# Patient Record
Sex: FEMALE | URBAN - METROPOLITAN AREA
[De-identification: names, ages, dates, MRNs, and addresses within clinical notes are randomized per-mention and may not be internally consistent; named-entity substitution may affect disease eponyms.]

---

## 2023-12-23 ENCOUNTER — HOSPITAL ENCOUNTER (INPATIENT)
Facility: HOSPITAL | Age: 78
LOS: 1 days | Discharge: ANOTHER HOSPITAL | DRG: 312 | End: 2023-12-24
Attending: STUDENT IN AN ORGANIZED HEALTH CARE EDUCATION/TRAINING PROGRAM | Admitting: STUDENT IN AN ORGANIZED HEALTH CARE EDUCATION/TRAINING PROGRAM

## 2023-12-23 ENCOUNTER — APPOINTMENT (OUTPATIENT)
Dept: RADIOLOGY | Facility: HOSPITAL | Age: 78
DRG: 312 | End: 2023-12-23

## 2023-12-23 DIAGNOSIS — Z86.79 HISTORY OF CEREBELLAR HEMORRHAGE: ICD-10-CM

## 2023-12-23 DIAGNOSIS — R55 SYNCOPE, UNSPECIFIED SYNCOPE TYPE: Primary | ICD-10-CM

## 2023-12-23 LAB
APTT PPP: 22.5 SECONDS (ref 22–32.5)
INR PPP: 1.2 (ref 0.9–1.2)
PROTHROMBIN TIME: 13 SECONDS (ref 9.3–12.7)

## 2023-12-23 PROCEDURE — 36415 COLL VENOUS BLD VENIPUNCTURE: CPT | Performed by: STUDENT IN AN ORGANIZED HEALTH CARE EDUCATION/TRAINING PROGRAM

## 2023-12-23 PROCEDURE — 85025 COMPLETE CBC W/AUTO DIFF WBC: CPT | Performed by: STUDENT IN AN ORGANIZED HEALTH CARE EDUCATION/TRAINING PROGRAM

## 2023-12-23 PROCEDURE — 70450 CT HEAD/BRAIN W/O DYE: CPT

## 2023-12-23 PROCEDURE — 80048 BASIC METABOLIC PNL TOTAL CA: CPT | Performed by: STUDENT IN AN ORGANIZED HEALTH CARE EDUCATION/TRAINING PROGRAM

## 2023-12-23 PROCEDURE — 99285 EMERGENCY DEPT VISIT HI MDM: CPT | Performed by: STUDENT IN AN ORGANIZED HEALTH CARE EDUCATION/TRAINING PROGRAM

## 2023-12-23 PROCEDURE — 96374 THER/PROPH/DIAG INJ IV PUSH: CPT

## 2023-12-23 PROCEDURE — 85730 THROMBOPLASTIN TIME PARTIAL: CPT | Performed by: STUDENT IN AN ORGANIZED HEALTH CARE EDUCATION/TRAINING PROGRAM

## 2023-12-23 PROCEDURE — 84484 ASSAY OF TROPONIN QUANT: CPT | Performed by: STUDENT IN AN ORGANIZED HEALTH CARE EDUCATION/TRAINING PROGRAM

## 2023-12-23 PROCEDURE — 85610 PROTHROMBIN TIME: CPT | Performed by: STUDENT IN AN ORGANIZED HEALTH CARE EDUCATION/TRAINING PROGRAM

## 2023-12-23 ASSESSMENT — COLUMBIA-SUICIDE SEVERITY RATING SCALE - C-SSRS
6. HAVE YOU EVER DONE ANYTHING, STARTED TO DO ANYTHING, OR PREPARED TO DO ANYTHING TO END YOUR LIFE?: NO
2. HAVE YOU ACTUALLY HAD ANY THOUGHTS OF KILLING YOURSELF?: NO
1. IN THE PAST MONTH, HAVE YOU WISHED YOU WERE DEAD OR WISHED YOU COULD GO TO SLEEP AND NOT WAKE UP?: NO

## 2023-12-24 ENCOUNTER — APPOINTMENT (OUTPATIENT)
Dept: RADIOLOGY | Facility: HOSPITAL | Age: 78
DRG: 312 | End: 2023-12-24

## 2023-12-24 ENCOUNTER — APPOINTMENT (OUTPATIENT)
Dept: CARDIOLOGY | Facility: HOSPITAL | Age: 78
DRG: 312 | End: 2023-12-24

## 2023-12-24 VITALS
WEIGHT: 156.53 LBS | DIASTOLIC BLOOD PRESSURE: 47 MMHG | RESPIRATION RATE: 15 BRPM | HEIGHT: 66 IN | HEART RATE: 73 BPM | SYSTOLIC BLOOD PRESSURE: 123 MMHG | OXYGEN SATURATION: 91 % | BODY MASS INDEX: 25.16 KG/M2 | TEMPERATURE: 100.6 F

## 2023-12-24 PROBLEM — R55 SYNCOPE, UNSPECIFIED SYNCOPE TYPE: Status: ACTIVE | Noted: 2023-12-24

## 2023-12-24 LAB
ALBUMIN SERPL-MCNC: 3.9 G/DL (ref 3.5–5)
ALP BLD-CCNC: 74 U/L (ref 35–125)
ALT SERPL-CCNC: 40 U/L (ref 5–40)
ANION GAP SERPL CALC-SCNC: 13 MMOL/L
AST SERPL-CCNC: 48 U/L (ref 5–40)
BASOPHILS # BLD AUTO: 0.02 X10*3/UL (ref 0–0.1)
BASOPHILS NFR BLD AUTO: 0.1 %
BILIRUB SERPL-MCNC: 1.9 MG/DL (ref 0.1–1.2)
BUN SERPL-MCNC: 28 MG/DL (ref 8–25)
CALCIUM SERPL-MCNC: 10 MG/DL (ref 8.5–10.4)
CHLORIDE SERPL-SCNC: 96 MMOL/L (ref 97–107)
CO2 SERPL-SCNC: 22 MMOL/L (ref 24–31)
CREAT SERPL-MCNC: 1 MG/DL (ref 0.4–1.6)
EOSINOPHIL # BLD AUTO: 0.07 X10*3/UL (ref 0–0.4)
EOSINOPHIL NFR BLD AUTO: 0.5 %
ERYTHROCYTE [DISTWIDTH] IN BLOOD BY AUTOMATED COUNT: 14.7 % (ref 11.5–14.5)
GFR SERPL CREATININE-BSD FRML MDRD: 59 ML/MIN/1.73M*2
GLUCOSE BLD MANUAL STRIP-MCNC: 140 MG/DL (ref 74–99)
GLUCOSE SERPL-MCNC: 148 MG/DL (ref 65–99)
HCT VFR BLD AUTO: 43.7 % (ref 36–46)
HGB BLD-MCNC: 15.1 G/DL (ref 12–16)
IMM GRANULOCYTES # BLD AUTO: 0.16 X10*3/UL (ref 0–0.5)
IMM GRANULOCYTES NFR BLD AUTO: 1.2 % (ref 0–0.9)
LYMPHOCYTES # BLD AUTO: 2.69 X10*3/UL (ref 0.8–3)
LYMPHOCYTES NFR BLD AUTO: 19.7 %
MCH RBC QN AUTO: 30.5 PG (ref 26–34)
MCHC RBC AUTO-ENTMCNC: 34.6 G/DL (ref 32–36)
MCV RBC AUTO: 88 FL (ref 80–100)
MONOCYTES # BLD AUTO: 2.5 X10*3/UL (ref 0.05–0.8)
MONOCYTES NFR BLD AUTO: 18.3 %
NEUTROPHILS # BLD AUTO: 8.19 X10*3/UL (ref 1.6–5.5)
NEUTROPHILS NFR BLD AUTO: 60.2 %
NRBC BLD-RTO: 0 /100 WBCS (ref 0–0)
PLATELET # BLD AUTO: 130 X10*3/UL (ref 150–450)
POTASSIUM SERPL-SCNC: 3.6 MMOL/L (ref 3.4–5.1)
PROT SERPL-MCNC: 7.1 G/DL (ref 5.9–7.9)
RBC # BLD AUTO: 4.95 X10*6/UL (ref 4–5.2)
RBC MORPH BLD: NORMAL
SARS-COV-2 RNA RESP QL NAA+PROBE: NOT DETECTED
SODIUM SERPL-SCNC: 131 MMOL/L (ref 133–145)
TROPONIN T SERPL-MCNC: 15 NG/L
TROPONIN T SERPL-MCNC: 16 NG/L
WBC # BLD AUTO: 13.6 X10*3/UL (ref 4.4–11.3)

## 2023-12-24 PROCEDURE — 93005 ELECTROCARDIOGRAM TRACING: CPT

## 2023-12-24 PROCEDURE — 2500000005 HC RX 250 GENERAL PHARMACY W/O HCPCS: Performed by: STUDENT IN AN ORGANIZED HEALTH CARE EDUCATION/TRAINING PROGRAM

## 2023-12-24 PROCEDURE — 51702 INSERT TEMP BLADDER CATH: CPT

## 2023-12-24 PROCEDURE — 82947 ASSAY GLUCOSE BLOOD QUANT: CPT

## 2023-12-24 PROCEDURE — 84484 ASSAY OF TROPONIN QUANT: CPT | Performed by: STUDENT IN AN ORGANIZED HEALTH CARE EDUCATION/TRAINING PROGRAM

## 2023-12-24 PROCEDURE — 36415 COLL VENOUS BLD VENIPUNCTURE: CPT | Performed by: STUDENT IN AN ORGANIZED HEALTH CARE EDUCATION/TRAINING PROGRAM

## 2023-12-24 PROCEDURE — 2500000004 HC RX 250 GENERAL PHARMACY W/ HCPCS (ALT 636 FOR OP/ED): Performed by: STUDENT IN AN ORGANIZED HEALTH CARE EDUCATION/TRAINING PROGRAM

## 2023-12-24 PROCEDURE — 2060000001 HC INTERMEDIATE ICU ROOM DAILY

## 2023-12-24 PROCEDURE — 87635 SARS-COV-2 COVID-19 AMP PRB: CPT | Performed by: STUDENT IN AN ORGANIZED HEALTH CARE EDUCATION/TRAINING PROGRAM

## 2023-12-24 PROCEDURE — 71045 X-RAY EXAM CHEST 1 VIEW: CPT

## 2023-12-24 RX ORDER — ONDANSETRON HYDROCHLORIDE 2 MG/ML
4 INJECTION, SOLUTION INTRAVENOUS EVERY 8 HOURS PRN
Status: CANCELLED | OUTPATIENT
Start: 2023-12-24

## 2023-12-24 RX ORDER — ONDANSETRON 4 MG/1
4 TABLET, ORALLY DISINTEGRATING ORAL EVERY 8 HOURS PRN
Status: CANCELLED | OUTPATIENT
Start: 2023-12-24

## 2023-12-24 RX ORDER — CALCIUM CARBONATE 300MG(750)
400 TABLET,CHEWABLE ORAL DAILY
COMMUNITY

## 2023-12-24 RX ORDER — TALC
3 POWDER (GRAM) TOPICAL DAILY
Status: CANCELLED | OUTPATIENT
Start: 2023-12-24

## 2023-12-24 RX ORDER — PANTOPRAZOLE SODIUM 40 MG/10ML
40 INJECTION, POWDER, LYOPHILIZED, FOR SOLUTION INTRAVENOUS
Status: CANCELLED | OUTPATIENT
Start: 2023-12-25

## 2023-12-24 RX ORDER — LANOLIN ALCOHOL/MO/W.PET/CERES
400 CREAM (GRAM) TOPICAL DAILY
Status: CANCELLED | OUTPATIENT
Start: 2023-12-24

## 2023-12-24 RX ORDER — POLYETHYLENE GLYCOL 3350 17 G/17G
17 POWDER, FOR SOLUTION ORAL DAILY PRN
Status: CANCELLED | OUTPATIENT
Start: 2023-12-24

## 2023-12-24 RX ORDER — ONDANSETRON HYDROCHLORIDE 2 MG/ML
4 INJECTION, SOLUTION INTRAVENOUS ONCE
Status: COMPLETED | OUTPATIENT
Start: 2023-12-24 | End: 2023-12-24

## 2023-12-24 RX ORDER — SPIRONOLACTONE 50 MG/1
50 TABLET, FILM COATED ORAL DAILY
COMMUNITY

## 2023-12-24 RX ORDER — ACETAMINOPHEN 325 MG/1
650 TABLET ORAL ONCE
Status: DISCONTINUED | OUTPATIENT
Start: 2023-12-24 | End: 2023-12-24 | Stop reason: HOSPADM

## 2023-12-24 RX ORDER — SPIRONOLACTONE 25 MG/1
50 TABLET ORAL DAILY
Status: CANCELLED | OUTPATIENT
Start: 2023-12-24

## 2023-12-24 RX ORDER — SERTRALINE HYDROCHLORIDE 50 MG/1
50 TABLET, FILM COATED ORAL DAILY
COMMUNITY

## 2023-12-24 RX ORDER — FUROSEMIDE 20 MG/1
20 TABLET ORAL DAILY
Status: CANCELLED | OUTPATIENT
Start: 2023-12-24

## 2023-12-24 RX ORDER — PANTOPRAZOLE SODIUM 40 MG/1
40 TABLET, DELAYED RELEASE ORAL
Status: CANCELLED | OUTPATIENT
Start: 2023-12-25

## 2023-12-24 RX ORDER — SERTRALINE HYDROCHLORIDE 50 MG/1
50 TABLET, FILM COATED ORAL DAILY
Status: CANCELLED | OUTPATIENT
Start: 2023-12-24

## 2023-12-24 RX ORDER — FUROSEMIDE 20 MG/1
TABLET ORAL
COMMUNITY

## 2023-12-24 RX ADMIN — ONDANSETRON 4 MG: 2 INJECTION INTRAMUSCULAR; INTRAVENOUS at 04:20

## 2023-12-24 RX ADMIN — Medication 2 L/MIN: at 05:21

## 2023-12-24 ASSESSMENT — LIFESTYLE VARIABLES
HAVE PEOPLE ANNOYED YOU BY CRITICIZING YOUR DRINKING: NO
EVER FELT BAD OR GUILTY ABOUT YOUR DRINKING: NO
EVER HAD A DRINK FIRST THING IN THE MORNING TO STEADY YOUR NERVES TO GET RID OF A HANGOVER: NO
HAVE YOU EVER FELT YOU SHOULD CUT DOWN ON YOUR DRINKING: NO
REASON UNABLE TO ASSESS: NO

## 2023-12-24 ASSESSMENT — ENCOUNTER SYMPTOMS
SHORTNESS OF BREATH: 0
CONFUSION: 0
FEVER: 0
COUGH: 0
NAUSEA: 0
CHILLS: 0
TROUBLE SWALLOWING: 0
BACK PAIN: 0
DIFFICULTY URINATING: 0
VOMITING: 0
ARTHRALGIAS: 0
ABDOMINAL DISTENTION: 0

## 2023-12-24 ASSESSMENT — PAIN DESCRIPTION - PROGRESSION: CLINICAL_PROGRESSION: GRADUALLY IMPROVING

## 2023-12-24 ASSESSMENT — PAIN - FUNCTIONAL ASSESSMENT: PAIN_FUNCTIONAL_ASSESSMENT: 0-10

## 2023-12-24 ASSESSMENT — PAIN SCALES - GENERAL: PAINLEVEL_OUTOF10: 3

## 2023-12-24 NOTE — ED TRIAGE NOTES
Patient arrives for evaluation of syncopal episode that occurred at 2200 this evening. Patient vomited twice after syncopal episode. Patient arrives with GCS 15 and no obvious distress.

## 2023-12-24 NOTE — DISCHARGE SUMMARY
Discharge Diagnosis  Syncope, unspecified syncope type    Issues Requiring Follow-Up  syncope    Discharge Meds     Your medication list        ASK your doctor about these medications        Instructions Last Dose Given Next Dose Due   furosemide 20 mg tablet  Commonly known as: Lasix           magnesium oxide 400 mg tablet  Commonly known as: Mag-Ox           sertraline 50 mg tablet  Commonly known as: Zoloft           spironolactone 50 mg tablet  Commonly known as: Aldactone                    Test Results Pending At Discharge  Pending Labs       No current pending labs.            Hospital Course   78yoF with hx of recent ICH 11/6/23 who presented after having a near syncopal event. Recently admitted at [facility] for intracranial hemorrhage. In the ED, CT head showing hyperdensity within the left cerebella peduncle with adjacent faint diminished attenuation is likely resolving hemorrhage, No new areas of hemorrhage are appreciated, bilateral maxillary sinusitis. Family requested transfer to [facility] for continuity of care. Patient was admitted after boarding in the ED awaiting bed at [facility]. Bed is now available and patient will be transferred to [facility].     Pertinent Physical Exam At Time of Discharge  Physical Exam    Outpatient Follow-Up  No future appointments.      Rocío Otto MD

## 2023-12-24 NOTE — H&P
"History Of Present Illness  Lisa Thompson is a 78 y.o. female hx of recent ICH 11/6/23, liver cirrhosis who presented after having a near syncopal event. Majority of history obtained by son, Regino, who is present at bedside. Regino states that they were all at his brother's house yesterday to prepare for alina tomorrow. Around 10-10:30pm last night, patient had sudden onset dizziness, loss of balance, nausea, and vomiting. The episode lasted for a couple of minutes. Patient did not lose consciousness or fall during this episode. Patient lives at home alone but there are cameras around her house for family members to watch over her. Reports mechanical 1 fall at home on 12/23 when patient tripped going up the stairs. Patient endorses hitting the back of her head and scraping her left shin/foot during the fall but was able to get up on her own. Denies LOC. Per Regino, patient was completely fine after the fall and was acting normally. Regino states that patient has been depressed after the recent stroke, often does not have the motivation to eat very much when she's home alone. Given recent stroke and fall at home, family was concerned for another neurologic event when patient had this near syncopal episode which prompted them to come to the ED.     Patient was recently admitted at [facility] from 11/6-11/14 with an intracranial hemorrhage. Unknown etiology of the ICH, suspected possibly related to liver disease coagulopathy vs HT from COVID coagulopathy. Patient was discharged to SNF and did well. Discharged from SNF approximately 2 weeks ago. Per son, patient has residual left upper extremity \"shakiness\" and mild left lower extremity weakness from the stroke but they do not interfere with daily living. Patient is able to ambulate independently and uses a walker when ambulating longer distances.     In the ED, labs showing Na 131, Cl 96, bicarb 22, Cr 1.0, WBC 13.6, Hgb 15.1. CT head showing hyperdensity within the left " cerebella peduncle with adjacent faint diminished attenuation is likely resolving hemorrhage, No new areas of hemorrhage are appreciated, bilateral maxillary sinusitis. Per family - would like patient transferred to [facility] to continuity of care. Patient was accepted for transfer at [facility] by Dr. Tate pending bed availability.     Discussed code status with patient and son, Regino - patient would like to be FULL CODE.      Past Medical History  She has a past medical history of Stroke (CMS/[facility]).    Surgical History  She has no past surgical history on file.     Social History  She has no history on file for tobacco use, alcohol use, and drug use.    Family History  No family history on file.     Allergies  Patient has no known allergies.    Review of Systems   Constitutional:  Negative for chills and fever.   HENT:  Negative for trouble swallowing.    Respiratory:  Negative for cough and shortness of breath.    Cardiovascular:  Negative for chest pain.   Gastrointestinal:  Negative for abdominal distention, nausea and vomiting.   Genitourinary:  Negative for difficulty urinating.   Musculoskeletal:  Negative for arthralgias and back pain.   Skin:  Negative for rash.   Psychiatric/Behavioral:  Negative for confusion.         Physical Exam  Constitutional:       General: She is not in acute distress.     Appearance: She is not toxic-appearing.   HENT:      Head: Normocephalic and atraumatic.      Mouth/Throat:      Mouth: Mucous membranes are moist.      Pharynx: Oropharynx is clear.   Eyes:      General: No scleral icterus.  Cardiovascular:      Rate and Rhythm: Normal rate and regular rhythm.   Pulmonary:      Effort: No respiratory distress.      Breath sounds: No wheezing.   Abdominal:      General: There is no distension.      Palpations: Abdomen is soft.   Musculoskeletal:         General: Signs of injury (abrasion on left shin and left foot) present.      Right lower leg: No edema.      Left lower leg:  No edema.   Skin:     Findings: Bruising (left foot) present.   Neurological:      Mental Status: She is alert and oriented to person, place, and time.          Last Recorded Vitals  /65   Pulse 73   Temp 38.1 °C (100.6 °F)   Resp 15   Wt 71 kg (156 lb 8.4 oz)   SpO2 97%     Relevant Results           Assessment/Plan   Principal Problem:    Syncope, unspecified syncope type      Near syncope  Unclear etiology, possibly orthostatic vs neurologic vs hyponatremia  CT head showing hyperdensity within the left cerebella peduncle  Will consult neurology   Hold lasix and aldactone for now  Obtain orthostatics  Fall precautions    Hyponatremia  Hold SSRI for now    Leukocytosis, possibly reactive  Thrombocytopenia 2/2 liver cirrhosis, chronic stable  Check CBC in AM    FULL CODE    Dispo: accepted for transfer to [address], awaiting bed availability         Rocío Otto MD

## 2023-12-24 NOTE — ED PROVIDER NOTES
HPI   Chief Complaint   Patient presents with    Syncope       78-year-old female presents after syncopal episode.  Patient was recently diagnosed with a cerebellar stroke in November 2023.  She was admitted and evaluated at [facility].  She was then discharged to a rehab facility and discharged home.  No residual weakness.  While at her son's house today, the patient had a syncopal episode, where she became weak, dizzy, sat down and began vomiting.  Due to her history of a brain bleed, son was concerned that she may have a repeat stroke.  Patient was then transported to [facility] [place], operated by [facility] ED as we were the closest hospital for stroke evaluation.                          [place] Coma Scale Score: 15                  Patient History   Past Medical History:   Diagnosis Date    Stroke (CMS/[facility])      History reviewed. No pertinent surgical history.  No family history on file.  Social History     Tobacco Use    Smoking status: Not on file    Smokeless tobacco: Not on file   Substance Use Topics    Alcohol use: Not on file    Drug use: Not on file       Physical Exam   ED Triage Vitals   Temp Heart Rate Resp BP   12/24/23 0140 12/23/23 2330 12/23/23 2330 12/23/23 2330   37 °C (98.6 °F) 71 15 128/58      SpO2 Temp Source Heart Rate Source Patient Position   12/23/23 2329 12/24/23 0140 -- --   (!) 88 % Oral        BP Location FiO2 (%)     -- --             Physical Exam  Vitals and nursing note reviewed.   Constitutional:       General: She is not in acute distress.     Appearance: She is not ill-appearing.   HENT:      Head: Normocephalic and atraumatic.      Mouth/Throat:      Mouth: Mucous membranes are moist.      Pharynx: Oropharynx is clear.   Eyes:      Extraocular Movements: Extraocular movements intact.      Conjunctiva/sclera: Conjunctivae normal.      Pupils: Pupils are equal, round, and reactive to light.   Cardiovascular:      Rate and Rhythm: Normal rate and regular rhythm.   Pulmonary:      Effort:  Pulmonary effort is normal. No respiratory distress.      Breath sounds: Normal breath sounds.   Abdominal:      General: There is no distension.      Palpations: Abdomen is soft.      Tenderness: There is no abdominal tenderness.   Musculoskeletal:         General: No swelling or deformity. Normal range of motion.      Cervical back: Normal range of motion and neck supple.   Skin:     General: Skin is warm and dry.      Capillary Refill: Capillary refill takes less than 2 seconds.   Neurological:      General: No focal deficit present.      Mental Status: She is alert and oriented to person, place, and time. Mental status is at baseline.      Cranial Nerves: Cranial nerves 2-12 are intact.      Sensory: Sensation is intact.      Motor: Motor function is intact.   Psychiatric:         Mood and Affect: Mood normal.         Behavior: Behavior normal.         ED Course & MDM   ED Course as of 12/24/23 0631   Sun Dec 24, 2023   0013 Prior CT on 11/6/23: Hemorrhage:  New 1.8 x 1.2 cm parenchymal hematoma centered in the   anteromedial left cerebellum in the region of the dentate nucleus with   mild surrounding edema and minimal local mass effect.    [JM]   0014 CT head wo IV contrast  Per Vrad: small hyperdensity in the left cerebellar lob, no acute infarct.  Direct comparison to prior studies is recommended. [JM]   0017 Performed at  0014, HR of 70, NSR, NAD, QTc 466, no sign of STEMI or NSTEMI, no Q wave or T wave abnormality noted.    Reviewed and interpreted by me at time performed   [JM]   0104 Spoke with Vrad - spoke with radiologist regarding comparison study.  Images not available but  [JM]   0220 Spoke with Dr. Tate with [facility] regarding transfer.  Accepts patient for transfer.  Pending bed availability [JM]      ED Course User Index  [JM] Racheal Bartholomew MD         Diagnoses as of 12/24/23 0631   Syncope, unspecified syncope type   History of cerebellar hemorrhage       Medical Decision  Making  78 y.o. female presents to the ED after syncopal event.  I have considered the following conditions in my assessment of this patient's condition: abdominal aneurysm acute or rupture, CVA, TIA, GI bleed, acute MI, PE, seizure, subarachnoid hemorrhage, vasovagal syncope, ventricular tachycardia or arrhythmia.    NIH: 0    Patient considered high risk for serious cause of syncope.  Patient with recent cerebellar stroke, repeat head CT does not show new bleed.  Bleeding not worsening.  Patient without residual weakness.  Etiology of syncope remains unclear at this time.  Patient initially hypoxic on arrival, placed on 2 L nasal cannula.  Spoke with patient's family who is requesting transfer to [facility] for continuity of care.  Spoke with [facility] physicians who accept patient for transfer.  At this time patient is pending [employer] availability at [facility].  Patient is currently stable on 2 L nasal cannula.  Patient turned over to oncoming provider who will follow up patient transfer.                Procedure  Procedures     Racheal Bartholomew MD  12/24/23 0663

## 2023-12-25 NOTE — PROGRESS NOTES
Patient was initially seen by my colleague and endorsed to me on signout pending transfer.  Patient was still awaiting bed placement at [facility] and as there was no and time [employer] per the transfer center I did discuss case with hospitalist who excepted patient for observation until patient could be transferred to [facility].  Patient was admitted in stable condition.  She did become slightly febrile and had a chest x-ray ordered at that time.  She was given p.o. Tylenol.  While awaiting a bed up in the hospital patient did receive bed placement several hours later at [facility] and was transferred in stable condition.

## 2023-12-27 LAB
ATRIAL RATE: 70 BPM
P AXIS: 56 DEGREES
P OFFSET: 213 MS
P ONSET: 156 MS
PR INTERVAL: 134 MS
Q ONSET: 223 MS
QRS COUNT: 11 BEATS
QRS DURATION: 88 MS
QT INTERVAL: 432 MS
QTC CALCULATION(BAZETT): 466 MS
QTC FREDERICIA: 455 MS
R AXIS: 26 DEGREES
T AXIS: 20 DEGREES
T OFFSET: 439 MS
VENTRICULAR RATE: 70 BPM